# Patient Record
Sex: MALE | Race: OTHER | URBAN - METROPOLITAN AREA
[De-identification: names, ages, dates, MRNs, and addresses within clinical notes are randomized per-mention and may not be internally consistent; named-entity substitution may affect disease eponyms.]

---

## 2017-05-23 ENCOUNTER — OFFICE VISIT (OUTPATIENT)
Dept: FAMILY MEDICINE CLINIC | Age: 42
End: 2017-05-23

## 2017-05-23 VITALS
BODY MASS INDEX: 27.06 KG/M2 | TEMPERATURE: 97.5 F | OXYGEN SATURATION: 98 % | HEIGHT: 66 IN | SYSTOLIC BLOOD PRESSURE: 117 MMHG | DIASTOLIC BLOOD PRESSURE: 70 MMHG | HEART RATE: 60 BPM | WEIGHT: 168.4 LBS

## 2017-05-23 DIAGNOSIS — G89.29 CHRONIC PAIN OF RIGHT KNEE: Primary | ICD-10-CM

## 2017-05-23 DIAGNOSIS — M25.561 CHRONIC PAIN OF RIGHT KNEE: Primary | ICD-10-CM

## 2017-05-23 DIAGNOSIS — H81.10 BPPV (BENIGN PAROXYSMAL POSITIONAL VERTIGO), UNSPECIFIED LATERALITY: ICD-10-CM

## 2017-05-23 RX ORDER — MECLIZINE HYDROCHLORIDE 25 MG/1
25 TABLET ORAL
Qty: 30 TAB | Refills: 0 | Status: SHIPPED | OUTPATIENT
Start: 2017-05-23 | End: 2017-06-02

## 2017-05-23 RX ORDER — IBUPROFEN 800 MG/1
800 TABLET ORAL
Qty: 60 TAB | Refills: 0 | Status: SHIPPED | OUTPATIENT
Start: 2017-05-23

## 2017-05-23 NOTE — PROGRESS NOTES
Assessment/Plan:       ICD-10-CM ICD-9-CM    1. Chronic pain of right knee M25.561 719.46 ibuprofen (MOTRIN) 800 mg tablet    G89.29 338.29    2. BPPV (benign paroxysmal positional vertigo), unspecified laterality H81.10 386.11 meclizine (ANTIVERT) 25 mg tablet     Follow-up Disposition:  Return if symptoms worsen or fail to improve. [facility]  Subjective:     Chief Complaint   Patient presents with    Dizziness     pt c/o dizziness x3 days    Knee Pain     pt c/o pain on right knee x6 months   Has had vertigo for 3 days. The room was spinning. Started Friday, Saturday it was bad. Yesterday, heavy head. None since the first day. Feels insecure in the medial knee with walking. No injury. Since December. Right knee. 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 He kneels on grass. He walks hills. He used menthol in the beginning. Radha Grissom is a 39 y.o. OTHER male who speaks Syrian. He also has  does not have a problem list on file. and a history of  has no past medical history on file. .  Review of Systems: Positive for   Negative for: fever, chest pain, shortness of breath, leg swelling, exertional dyspnea, palpitations. Current Medications:   No current outpatient prescriptions on file prior to visit. No current facility-administered medications on file prior to visit. Past Surgical History: He  has no past surgical history on file. Social and Family History: He  reports that he quit smoking about 2 years ago. He does not have any smokeless tobacco history on file. He reports that he does not drink alcohol or use illicit drugs. ; family history is not on file. .    Objective:     Vitals:    05/23/17 1046   BP: 117/70   Pulse: 60   Temp: 97.5 °F (36.4 °C)   TempSrc: Oral   SpO2: 98%   Weight: 168 lb 6.4 oz (76.4 kg)   Height: 5' 6.38\" (1.686 m)    No LMP for male patient.    Wt Readings from Last 2 Encounters:   05/23/17 168 lb 6.4 oz (76.4 kg)   07/24/14 164 lb 6.4 oz (74.6 kg)     Physical Examination: Normal knee exam bilaterally. Neg Hallpike pauline maneuver. TMs clear. General appearance - well developed, no acute distress. Chest - clear to auscultation. Heart - regular rate and rhythm without murmurs, rubs, or gallops. Abdomen - bowel sounds present x 4, NT, ND. Extremities - pulses intact. No peripheral edema. Assessment/Plan:   Hector Rust was seen today for dizziness and knee pain. Diagnoses and all orders for this visit:    Chronic pain of right knee  -     ibuprofen (MOTRIN) 800 mg tablet; Take 1 Tab by mouth two (2) times daily as needed for Pain. French sig    BPPV (benign paroxysmal positional vertigo), unspecified laterality  -     meclizine (ANTIVERT) 25 mg tablet; Take 1 Tab by mouth three (3) times daily as needed for up to 10 days. For vertigo. French sig      Follow-up Disposition:  Return if symptoms worsen or fail to improve. Amadeo Fulton, MSN, RN, FNP-BC, BC-ADM  Sofia Bowser expressed understanding of this plan.

## 2017-05-23 NOTE — PROGRESS NOTES
Coordination of Care  1. Have you been to the ER, urgent care clinic since your last visit? Hospitalized since your last visit? No    2. Have you seen or consulted any other health care providers outside of the [facility] since your last visit? Include any pap smears or colon screening. No    Medications  Medication Reconciliation Performed: no  Patient does not need refills     Learning Assessment Complete?  yes

## 2017-05-23 NOTE — MR AVS SNAPSHOT
Visit Information Jazmyne Jenkins y Ravisal Personal Médico Departamento Teléfono del Dep. Número de visita 5/23/2017 10:30 AM S[facility] TanjaAMELIE JOSE [facility] [phone] Upcoming Health Maintenance Date Due Pneumococcal 19-64 Medium Risk (1 of 1 - PPSV23) 8/22/1994 DTaP/Tdap/Td series (1 - Tdap) 8/22/1996 INFLUENZA AGE 9 TO ADULT 8/1/2017 Alergias  Review Complete El: 5/23/2017 Por: Barb Nur A partir del:  5/23/2017 No Known Allergies Vacunas actuales Bly Aus No hay ninguna vacuna archivada. No revisadas esta visita You Were Diagnosed With   
  
 Kendrick Sesar Chronic pain of right knee    -  Primary ICD-10-CM: M25.561, W96.78 ICD-9-CM: 719.46, 338.29   
 BPPV (benign paroxysmal positional vertigo), unspecified laterality     ICD-10-CM: H81.10 ICD-9-CM: 386.11 Partes vitales PS Pulso Temperatura [place] ( percentil de crecimiento) Peso (percentil de crecimiento) SpO2  
 117/70 (BP 1 Location: Left arm) 60 97.5 °F (36.4 °C) (Oral) 5' 6.38\" (1.686 m) 168 lb 6.4 oz (76.4 kg) 98% BMI ([address]) Estatus de tabaquísmo 26.87 kg/m2 Former Smoker Historial de signos vitales BMI and BSA Data Body Mass Index Body Surface Area  
 26.87 kg/m 2 1.89 m 2 Daniel [facility] Name Phone [facility] [address] [facility] [phone] Manuel lista de medicamentos actualizada Lista actualizada el: 5/23/17 11:38 AM.  Lethaniel Heimlich use manuel lista de medicamentos más reciente. ibuprofen 800 mg tablet También conocido jose a:  MOTRIN Take 1 Tab by mouth two (2) times daily as needed for Pain. Syrian sig  
  
 meclizine 25 mg tablet También conocido jose a:  ANTIVERT Take 1 Tab by mouth three (3) times daily as needed for up to 10 days. For vertigo. Syrian sig Recetas Enviado a la Porfirio Refills  
 ibuprofen (MOTRIN) 800 mg tablet 0 Sig: Take 1 Tab by mouth two (2) times daily as needed for Pain. Mauritian sig Class: Normal  
 Pharmacy: [address] [facility] [address] [facility] #: [phone] Route: Oral  
 meclizine (ANTIVERT) 25 mg tablet 0 Sig: Take 1 Tab by mouth three (3) times daily as needed for up to 10 days. For vertigo. Mauritian sig Class: Normal  
 Pharmacy: [address] [facility] [address] [facility] Ph #: [phone] Route: Oral  
  
Instrucciones para el Paciente Dolor de rodilla: Instrucciones de cuidado - [ Knee Pain: Care Instructions ] Instrucciones de cuidado El uso excesivo es yumiko causa de dolor en la rodilla. Otras causas son subir escaleras, arrodillarse y hacer otras actividades en las que se R Nyasia 11. El desgaste cotidiano, especialmente al envejecer, también puede causar dolor de rodilla. El descanso junto con el tratamiento en el [facility] suelen aliviar el dolor y permitir que sane la rodilla. Claus si usted tiene yumiko lesión importante en la rodilla, podría necesitar exámenes y tratamiento. La atención de seguimiento es yumiko parte clave de brown tratamiento y seguridad. Asegúrese de hacer y acudir a todas las citas, y llame a brown médico si está teniendo problemas. También es yumiko buena idea saber los resultados de kenyatta exámenes y mantener yumiko lista de los medicamentos que alicia. Cómo puede cuidarse en el [facility]? · Sea mario con los medicamentos. Ursula y siga todas las indicaciones en la etiqueta. ¨ Si el médico le recetó analgésicos (medicamentos para el dolor), tómelos según las indicaciones. ¨ Si no está tomando un analgésico recetado, pregúntele a brown médico si puede adilia ubaldo de [employer]. · Descanse y proteja brown rodilla. Deje de hacer cualquier actividad que pudiera causarle dolor.  
· Aplíquese hielo o yumiko compresa fría sobre la rodilla por entre 10 y 21 minutos cada vez. Póngase un paño vinson entre el hielo y la piel. · Apoye la rodilla adolorida sobre yumiko almohada cuando se aplica hielo o en cualquier momento que se siente o acueste brooks los 3 días siguientes. Trate de mantenerla por encima del nivel del corazón. Ila ayudará a reducir la hinchazón. · Si brown rodilla no está hinchada, puede aplicar calor húmedo, yumiko almohadilla térmica o un paño tibio sobre evangelina. · Si brown médico le recomienda un vendaje elástico, Janie Runner de compresión u otro tipo de soporte para la rodilla, úselos según se lo indique. · [address] brown médico acerca de cuánto peso puede poner sobre la pierna. Use un bastón, muletas o un andador pedro pablo jose a se lo hayan indicado. · [address] de brown médico acerca de la actividad brooks brown proceso de sanación. Si puede hacer ejercicio leve, aumente la actividad lentamente. · [place] y [place] un peso saludable. El exceso de peso puede sobrecargar las articulaciones, especialmente las rodillas y las caderas, y empeorar el dolor. Bajar incluso unas pocas libras podría ayudar. Cuándo debe pedir ayuda? Llame al 911 en cualquier momento que considere que necesita atención de [place]. Por ejemplo, llame si: · Tiene síntomas de un coágulo de lonnie en el pulmón (llamado embolia pulmonar). Estos pueden incluir: ¨ Dolor repentino en el pecho. ¨ Problemas para respirar. ¨ Toser lonnie. Llame a brown médico ahora mismo o busque atención médica inmediata si: 
· El dolor aumenta o es muy intenso. · La pierna o el pie se pone frío o cambia de color. · No puede ponerse de pie o poner peso [employer] rodilla. · La rodilla parece torcida o deformada. · No puede  la rodilla. · [address], tales jose a: ¨ Aumento del dolor, la hinchazón, el enrojecimiento o la temperatura. ¨ Vetas rojizas que comienzan en la rodilla. ¨ Pus que sale de alguna parte de la rodilla. Noryessicae Phild. · Tiene señales de un coágulo de lonnie en la pierna (llamado trombosis venosa profunda), tales jose a: ¨ Dolor en la pantorrilla, el muslo, la sharyn o detrás de la rodilla. ¨ Enrojecimiento e hinchazón en la pierna o la sharyn. Preste especial atención a los cambios en manuel lidia y asegúrese de comunicarse con manuel médico si: · Tiene hormigueo, debilidad o entumecimiento en la rodilla. · Tiene cualquier síntoma nuevo, por ejemplo, hinchazón. · Tiene moretones por yumiko lesión de rodilla que saha más de 2 semanas. · No mejora jose a se esperaba. Dónde puede encontrar más información en inglés? Lester Bain a http://gold-amber.info/. [place] [address] en la búsqueda para aprender más acerca de \"Dolor o lesión de rodilla: Instrucciones de cuidado - [ Knee Pain or Injury: Care Instructions ]. \" 
Revisado: [address], 2016 Versión del contenido: 11.2 © 3331-1931 Healthwise, Incorporated. Las instrucciones de cuidado fueron adaptadas bajo licencia por [employer] (which disclaims liability or warranty for this information). Si usted tiene [place] afección médica o sobre estas instrucciones, siempre pregunte a manuel profesional de lidia. Healthwise, Incorporated niega toda garantía o responsabilidad por manuel uso de esta información. Vértigo: Ejercicios - [ Vertigo: Exercises ] Instrucciones de cuidado Aquí se presentan algunos ejemplos de ejercicios típicos de rehabilitación para tratar manuel afección. Empiece cada ejercicio lentamente. Reduzca la intensidad del ejercicio si Petra  a tener dolor. Manuel médico o fisioterapeuta le dirán cuándo puede comenzar con estos ejercicios y cuáles funcionarán mejor para usted. Cómo hacer los ejercicios  
Nota: Chaparro estos ejercicios dos veces al día. Trate de ir aumentando [employer] cada movimiento de la víctor de 15 a 20 veces. Luego trate de hacerlo con los ojos cerrados. Ejercicio 1 1. Párese con yumiko silla sammy de usted y [facility] pared detrás de usted. Si empieza a caerse, puede utilizarlas para apoyarse. 2. Párese con los pies juntos y los brazos a los lados. 3. Mueva la víctor de arriba abajo 10 veces. Ejercicio 2 Mueva la víctor de lado a lado 10 veces. Ejercicio 3 Mueva la víctor diagonalmente de arriba abajo 10 veces. Ejercicio 4 Mueva la víctor diagonalmente de arriba abajo 10 veces del otro lado. La atención de seguimiento es yuimko parte clave de brown tratamiento y seguridad. Asegúrese de hacer y acudir a todas las citas, y llame a brown médico si está teniendo problemas. También es yumiko buena idea saber los resultados de los exámenes y mantener yumiko lista de los medicamentos que alicia. Dónde puede encontrar más información en inglés? Lester Bain a http://gold-amber.info/. [place] [address] en la búsqueda para aprender más acerca de \"Vértigo: Ejercicios - [ Vertigo: Exercises ]. \" 
Revisado: 29 julio, 2016 Versión del contenido: 11.2 © 7143-9287 Healthwise, Incorporated. Las instrucciones de cuidado fueron adaptadas bajo licencia por [employer] (which disclaims liability or warranty for this information). Si usted tiene [place] afección médica o sobre estas instrucciones, siempre pregunte a brown profesional de lidia. Healthwise, Incorporated niega toda garantía o responsabilidad por brown uso de esta información. Introducing [facility]! [facility] introduce portal paciente MyChart . Ahora se puede acceder a partes de brown expediente médico, enviar por correo electrónico la oficina de brown médico y solicitar renovaciones de medicamentos en línea. En brown navegador de Internet , Gaurav Queen a [email or web address] Education Group. [employer]. com/mycCalando Pharmaceuticalst Chaparro lan en el usuario por Minnie Rod? Farhat montenegro aquí en la sesión Rashida Ala. Verá la página de registro [place]. Ingrese brown código de [facility] pedro pablo y jose a aparece a continuación. Usted no tendrá que [employer] código después de lillie completado el proceso de registro . Si usted no se inscribe antes de la fecha de caducidad , debe solicitar un nuevo código. · MyChart Código de acceso : [id number] Expires: 8/21/2017 11:38 AM 
 
Ingresa los últimos cuatro dígitos de brown Número de Seguro Social ( xxxx ) y fecha de nacimiento ( dd / mm / aaaa ) jose a se indica y chaparro clic en Enviar. Usted será llevado a la siguiente página de registro . Crear un ID MyChart . Esta será brown ID de inicio de sesión de MyChart y no puede ser [place] , por lo que pensar en yumiko que es [place] y fácil de recordar . Crear yumiko contraseña MyChart . Usted puede cambiar brown contraseña en cualquier momento . Ingrese brown Password Reset de preguntas y [place] . [place] se puede utilizar en un momento posterior si usted olvida brown contraseña. Introduzca brown dirección de correo electrónico . Jessica Tapia recibirá yumiko notificación por correo electrónico cuando la nueva información está disponible en MyChart . Teresaroman Caban clic en Registrarse. Shalini Gram manuel y descargar porciones de brown expediente médico. 
Chaparro clic en el enlace de descarga del menú Resumen para descargar yumiko copia portátil de brown información médica . Si tiene [employer] , por favor visite la sección de preguntas frecuentes del sitio web MyChart . Recuerde, MyChart NO es que se utilizará para las necesidades urgentes. Para emergencias médicas , llame al 911 . Ahora disponible en brown iPhone y Android ! Por favor proporcione leidy resumen de la documentación de cuidado a brown próximo proveedor. If you have any questions after today's visit, please call [phone].

## 2017-05-23 NOTE — PROGRESS NOTES
Avs discussed with Adenike Fraga by Discharge Nurse Bernabe Carreon LPN, with  Heladio Holden. Discussed medications prescribed. Info given to dental resources. Pt verbalized understanding and has no further questions.  AVS printed and given to patient Bernabe Carreon LPN

## 2017-05-23 NOTE — PATIENT INSTRUCTIONS
Dolor de rodilla: Instrucciones de cuidado - [ Knee Pain: Care Instructions ]  Instrucciones de cuidado    El uso excesivo es yumiko causa de dolor en la rodilla. Otras causas son subir escaleras, arrodillarse y hacer otras actividades en las que se R Nyasia 11. El desgaste cotidiano, especialmente al envejecer, también puede causar dolor de rodilla. El descanso junto con el tratamiento en el [facility] suelen aliviar el dolor y permitir que sane la rodilla. Claus si usted tiene yumiko lesión importante en la rodilla, podría necesitar exámenes y tratamiento. La atención de seguimiento es yumiko parte clave de brown tratamiento y seguridad. Asegúrese de hacer y acudir a todas las citas, y llame a brown médico si está teniendo problemas. También es yumiko buena idea saber los resultados de kenyatta exámenes y mantener yumiko lista de los medicamentos que alicia. ¿Cómo puede cuidarse en el [facility]? · Sea mario con los medicamentos. Ursula y siga todas las indicaciones en la etiqueta. ¨ Si el médico le recetó analgésicos (medicamentos para el dolor), tómelos según las indicaciones. ¨ Si no está tomando un analgésico recetado, pregúntele a brown médico si puede adilia ubaldo de [employer]. · Descanse y proteja brown rodilla. Deje de hacer cualquier actividad que pudiera causarle dolor. · Aplíquese hielo o yumiko compresa fría sobre la rodilla por entre 10 y 21 minutos cada vez. Póngase un paño vinson entre el hielo y la piel. · Apoye la rodilla adolorida sobre yumiko almohada cuando se aplica hielo o en cualquier momento que se siente o acueste brooks los 3 días siguientes. Trate de mantenerla por encima del nivel del corazón. [place] ayudará a reducir la hinchazón. · Si brown rodilla no está hinchada, puede aplicar calor húmedo, yumiko almohadilla térmica o un paño tibio sobre evangelina. · Si brown médico le recomienda un vendaje elásticoErik de compresión u otro tipo de soporte para la rodilla, úselos según se lo indique.   · [address] brown médico acerca de cuánto peso puede poner sobre la pierna. Use un bastón, muletas o un andador pedro pablo jose a se lo hayan indicado. · [address] de brown médico acerca de la actividad brooks brown proceso de sanación. Si puede hacer ejercicio leve, aumente la actividad lentamente. · [place] y [place] un peso saludable. El exceso de peso puede sobrecargar las articulaciones, especialmente las rodillas y las caderas, y empeorar el dolor. Bajar incluso unas pocas libras podría ayudar. ¿Cuándo debe pedir ayuda? Llame al 911 en cualquier momento que considere que necesita atención de [place]. Por ejemplo, llame si:  · Tiene síntomas de un coágulo de lonnie en el pulmón (llamado embolia pulmonar). Estos pueden incluir:  ¨ Dolor repentino en el pecho. ¨ Problemas para respirar. ¨ Toser lonnie. Llame a brown médico ahora mismo o busque atención médica inmediata si:  · El dolor aumenta o es muy intenso. · La pierna o el pie se pone frío o cambia de color. · No puede ponerse de pie o poner peso [employer] rodilla. · La rodilla parece torcida o deformada. · No puede  la rodilla. · Tiene señales de infección, tales jose a:  ¨ Aumento del dolor, la hinchazón, el enrojecimiento o la temperatura. ¨ Vetas rojizas que comienzan en la rodilla. ¨ Pus que sale de alguna parte de la rodilla. Ardelia Elvie. · Tiene señales de un coágulo de lonnie en la pierna (llamado trombosis venosa profunda), tales jose a:  ¨ Dolor en la pantorrilla, el muslo, la sharyn o detrás de la rodilla. ¨ Enrojecimiento e hinchazón en la pierna o la sharyn. Preste especial atención a los cambios en brown lidia y asegúrese de comunicarse con brown médico si:  · Tiene hormigueo, debilidad o entumecimiento en la rodilla. · Tiene cualquier síntoma nuevo, por ejemplo, hinchazón. · Tiene moretones por yumiko lesión de rodilla que saha más de 2 semanas. · No mejora jose a se esperaba. ¿Dónde puede encontrar más información en inglés?   Jb Baldemar a http://gold-amber.info/. [place] [address] en la búsqueda para aprender más acerca de \"Dolor o lesión de rodilla: Instrucciones de cuidado - [ Knee Pain or Injury: Care Instructions ]. \"  Revisado: 27 [address], 2016  Versión del contenido: 11.2  © 4437-5207 Healthwise, Incorporated. Las instrucciones de cuidado fueron adaptadas bajo licencia por [employer] Connections (which disclaims liability or warranty for this information). Si usted tiene [place] afección médica o sobre estas instrucciones, siempre pregunte a brown profesional de lidia. Healthwise, Incorporated niega toda garantía o responsabilidad por brown uso de esta información. Vértigo: Ejercicios - [ Vertigo: Exercises ]  Instrucciones de cuidado  Aquí se presentan algunos ejemplos de ejercicios típicos de rehabilitación para tratar brown afección. Empiece cada ejercicio lentamente. Reduzca la intensidad del ejercicio si Mik Rickers a tener dolor. Brown médico o fisioterapeuta le dirán cuándo puede comenzar con estos ejercicios y cuáles funcionarán mejor para usted. Cómo hacer los ejercicios   Nota: Chaparro estos ejercicios dos veces al día. Trate de ir aumentando [employer] cada movimiento de la víctor de 15 a 20 veces. Luego trate de hacerlo con los ojos cerrados. Ejercicio 1    1. Párese con yumiko silla sammy de usted y [facility] pared detrás de usted. Si empieza a caerse, puede utilizarlas para apoyarse. 2. Párese con los pies juntos y los brazos a los lados. 3. Mueva la víctor de arriba abajo 10 veces. Ejercicio 2    Mueva la víctor de lado a lado 10 veces. Ejercicio 3    Mueva la víctor diagonalmente de arriba abajo 10 veces. Ejercicio 4    Mueva la víctor diagonalmente de [place] abajo 10 veces del otro lado. La atención de seguimiento es yumiko parte clave de brown tratamiento y seguridad. Asegúrese de hacer y acudir a todas las citas, y llame a brown médico si está teniendo problemas.  También es yumiko buena idea saber los Mester[place] exámenes y [employer] lista de los medicamentos que alicia. ¿Dónde puede encontrar más información en inglés? Mar López a http://gold-amber.info/. [address] en la búsqueda para aprender más acerca de \"Vértigo: Ejercicios - [ Vertigo: Exercises ]. \"  Revisado: 29 julio, 2016  Versión del contenido: 11.2  © 8749-8634 Healthwise, Incorporated. Las instrucciones de cuidado fueron adaptadas bajo licencia por [employer] (which disclaims liability or warranty for this information). Si usted tiene [place] afección médica o sobre estas instrucciones, siempre pregunte a brown profesional de lidia. Healthwise, Incorporated niega toda garantía o responsabilidad por brown uso de esta información.

## 2018-11-06 ENCOUNTER — OFFICE VISIT (OUTPATIENT)
Dept: FAMILY MEDICINE CLINIC | Age: 43
End: 2018-11-06

## 2018-11-06 VITALS
WEIGHT: 172 LBS | HEART RATE: 53 BPM | DIASTOLIC BLOOD PRESSURE: 74 MMHG | TEMPERATURE: 97.6 F | HEIGHT: 66 IN | BODY MASS INDEX: 27.64 KG/M2 | SYSTOLIC BLOOD PRESSURE: 113 MMHG

## 2018-11-06 DIAGNOSIS — G89.29 CHRONIC PAIN OF BOTH KNEES: Primary | ICD-10-CM

## 2018-11-06 DIAGNOSIS — M25.561 CHRONIC PAIN OF BOTH KNEES: Primary | ICD-10-CM

## 2018-11-06 DIAGNOSIS — M25.562 CHRONIC PAIN OF BOTH KNEES: Primary | ICD-10-CM

## 2018-11-06 NOTE — PROGRESS NOTES
The pt was given all information and AVS by the provider. Pt referred to the registrar to schedule Ortho appt. Per provider no nursing discharge is needed.   Shaun Peters RN

## 2018-11-06 NOTE — PROGRESS NOTES
Coordination of Care  1. Have you been to the ER, urgent care clinic since your last visit? Hospitalized since your last visit? [facility] 5 months ago for fever. 2. Have you seen or consulted any other health care providers outside of the [address] [place] [address] since your last visit? Include any pap smears or colon screening. No    Does the patient need refills? N/A    Learning Assessment Complete?  yes